# Patient Record
Sex: MALE | Race: WHITE | Employment: FULL TIME | URBAN - METROPOLITAN AREA
[De-identification: names, ages, dates, MRNs, and addresses within clinical notes are randomized per-mention and may not be internally consistent; named-entity substitution may affect disease eponyms.]

---

## 2017-01-23 ENCOUNTER — TELEPHONE (OUTPATIENT)
Dept: GASTROENTEROLOGY | Age: 27
End: 2017-01-23

## 2017-02-21 ENCOUNTER — OFFICE VISIT (OUTPATIENT)
Dept: GASTROENTEROLOGY | Age: 27
End: 2017-02-21

## 2017-02-21 VITALS
BODY MASS INDEX: 28.35 KG/M2 | DIASTOLIC BLOOD PRESSURE: 86 MMHG | OXYGEN SATURATION: 98 % | HEART RATE: 84 BPM | HEIGHT: 70 IN | SYSTOLIC BLOOD PRESSURE: 114 MMHG | WEIGHT: 198 LBS

## 2017-02-21 DIAGNOSIS — K21.9 GASTROESOPHAGEAL REFLUX DISEASE WITHOUT ESOPHAGITIS: ICD-10-CM

## 2017-02-21 DIAGNOSIS — R11.0 NAUSEA: ICD-10-CM

## 2017-02-21 DIAGNOSIS — R19.7 DIARRHEA, UNSPECIFIED TYPE: ICD-10-CM

## 2017-02-21 DIAGNOSIS — R10.33 PERIUMBILICAL ABDOMINAL PAIN: Primary | ICD-10-CM

## 2017-02-21 PROCEDURE — 99213 OFFICE O/P EST LOW 20 MIN: CPT | Performed by: INTERNAL MEDICINE

## 2017-02-21 RX ORDER — AMITRIPTYLINE HYDROCHLORIDE 25 MG/1
25 TABLET, FILM COATED ORAL NIGHTLY
Qty: 30 TABLET | Refills: 5 | Status: SHIPPED | OUTPATIENT
Start: 2017-02-21

## 2017-02-21 ASSESSMENT — ENCOUNTER SYMPTOMS
DIARRHEA: 1
ABDOMINAL PAIN: 1
EYES NEGATIVE: 1
RESPIRATORY NEGATIVE: 1
ALLERGIC/IMMUNOLOGIC NEGATIVE: 1

## 2017-03-15 ENCOUNTER — TELEPHONE (OUTPATIENT)
Dept: GASTROENTEROLOGY | Age: 27
End: 2017-03-15

## 2017-03-15 RX ORDER — DICYCLOMINE HYDROCHLORIDE 10 MG/1
10 CAPSULE ORAL
Qty: 120 CAPSULE | Refills: 3 | Status: SHIPPED | OUTPATIENT
Start: 2017-03-15 | End: 2018-02-22 | Stop reason: SDUPTHER

## 2017-04-04 ENCOUNTER — TELEPHONE (OUTPATIENT)
Dept: GASTROENTEROLOGY | Age: 27
End: 2017-04-04

## 2017-10-18 ENCOUNTER — TELEPHONE (OUTPATIENT)
Dept: GASTROENTEROLOGY | Age: 27
End: 2017-10-18

## 2017-10-18 NOTE — TELEPHONE ENCOUNTER
Pt called and LM on  requesting refill of Elavil. Pt states Dr. Marisol Herrera prescribed this and he is fine with scheduling an appt if he needs seen. Pt has not been seen since February. Pt will need to schedule an appt with Mason Bear if he chooses to stay with the office after informing him of Dr. Jesi Stevens departure. Attempted to call pt to inform him of Dr. Jesi Stevens departure and to schedule appt to get refill by Mason Bear and LM on  for patient to return call to office.

## 2017-10-18 NOTE — TELEPHONE ENCOUNTER
Patent called back and he has been scheduled for appt with Rubina Pandey on 10/31/17 at 800am. Patient expressed understanding and had no further questions.

## 2017-11-07 ENCOUNTER — TELEPHONE (OUTPATIENT)
Dept: GASTROENTEROLOGY | Age: 27
End: 2017-11-07

## 2017-11-07 NOTE — TELEPHONE ENCOUNTER
Patient called to reschedule appt with Merrily Schilder that he missed on 11/2/17. Patient has been rescheduled to 11/16/17 at 400pm. Patient expressed understanding and had no further questions.

## 2018-02-07 ENCOUNTER — TELEPHONE (OUTPATIENT)
Dept: GASTROENTEROLOGY | Age: 28
End: 2018-02-07

## 2018-02-07 NOTE — TELEPHONE ENCOUNTER
Patient called to make an appt with Zee Yates for abd pain. patient has been seen before but has not showed up for last couple appts. Patient has been scheduled to see Nadege Christopher on 2/22/18 at 220pm. Patient expressed understanding and had no further questions.

## 2018-02-22 ENCOUNTER — OFFICE VISIT (OUTPATIENT)
Dept: GASTROENTEROLOGY | Age: 28
End: 2018-02-22

## 2018-02-22 VITALS
BODY MASS INDEX: 26.96 KG/M2 | HEART RATE: 66 BPM | HEIGHT: 69 IN | SYSTOLIC BLOOD PRESSURE: 116 MMHG | DIASTOLIC BLOOD PRESSURE: 62 MMHG | WEIGHT: 182 LBS | OXYGEN SATURATION: 95 %

## 2018-02-22 DIAGNOSIS — R10.33 PERIUMBILICAL ABDOMINAL PAIN: ICD-10-CM

## 2018-02-22 DIAGNOSIS — R19.7 DIARRHEA, UNSPECIFIED TYPE: Primary | ICD-10-CM

## 2018-02-22 DIAGNOSIS — K21.9 GASTROESOPHAGEAL REFLUX DISEASE WITHOUT ESOPHAGITIS: ICD-10-CM

## 2018-02-22 PROCEDURE — 99214 OFFICE O/P EST MOD 30 MIN: CPT | Performed by: NURSE PRACTITIONER

## 2018-02-22 RX ORDER — DICYCLOMINE HYDROCHLORIDE 10 MG/1
10 CAPSULE ORAL
Qty: 120 CAPSULE | Refills: 3 | Status: SHIPPED | OUTPATIENT
Start: 2018-02-22

## 2018-02-22 ASSESSMENT — ENCOUNTER SYMPTOMS
HEMOPTYSIS: 0
COUGH: 0
ABDOMINAL PAIN: 1
BLOOD IN STOOL: 0
DIARRHEA: 1
BACK PAIN: 0
VOMITING: 0
PHOTOPHOBIA: 0
NAUSEA: 0
CONSTIPATION: 0
BLURRED VISION: 0
SPUTUM PRODUCTION: 0
DOUBLE VISION: 0
HEARTBURN: 1

## 2018-02-22 NOTE — PATIENT INSTRUCTIONS
anti-inflammatory medicines such as aspirin, ibuprofen (Advil, Motrin), and naproxen (Aleve). These can cause stomach upset. Talk to your doctor if you take daily aspirin for another health problem. When should you call for help? Call 911 anytime you think you may need emergency care. For example, call if:  ? · You passed out (lost consciousness). ? · You pass maroon or very bloody stools. ? · You vomit blood or what looks like coffee grounds. ? · You have new, severe belly pain. ?Call your doctor now or seek immediate medical care if:  ? · Your pain gets worse, especially if it becomes focused in one area of your belly. ? · You have a new or higher fever. ? · Your stools are black and look like tar, or they have streaks of blood. ? · You have unexpected vaginal bleeding. ? · You have symptoms of a urinary tract infection. These may include:  ¨ Pain when you urinate. ¨ Urinating more often than usual.  ¨ Blood in your urine. ? · You are dizzy or lightheaded, or you feel like you may faint. ? Watch closely for changes in your health, and be sure to contact your doctor if:  ? · You are not getting better after 1 day (24 hours). Where can you learn more? Go to https://Mobile Iron.[employer]. org and sign in to your [employer] account. Enter Q848 in the [facility] box to learn more about \"Abdominal Pain: Care Instructions. \"     If you do not have an account, please click on the \"Sign Up Now\" link. Current as of: March 20, 2017  Content Version: 11.5  © 4901-0685 [employer]. Care instructions adapted under license by [facility] ([facility]). If you have questions about a medical condition or this instruction, always ask your healthcare professional. [place] [address] any warranty or liability for your use of this information.        Patient Education        High-Fiber Diet: Care Instructions  Your Care Instructions    A high-fiber diet may help you relieve water). If you have kidney, heart, or liver disease and have to limit fluids, talk with your doctor before you increase the amount of fluids you drink. · Get at least 30 minutes of exercise on most days of the week. Walking is a good choice. You also may want to do other activities, such as running, swimming, cycling, or playing tennis or team sports. · Take a fiber supplement, such as Citrucel or Metamucil, every day if needed. Read and follow all instructions on the label. · Schedule time each day for a bowel movement. Having a daily routine may help. Take your time and do not strain when having a bowel movement. Some people avoid nuts, seeds, berries, and popcorn. They believe that these foods might get trapped in the diverticula and cause pain. But there is no proof that these foods cause diverticulitis or make it worse. How are these problems treated? · The best way to treat diverticulosis is to avoid constipation. (See the tips above.)  · Treatment for diverticulitis includes antibiotics and often a change in your diet. You may need only liquids at first. Your doctor may suggest pain medicines for pain or belly cramps. In some cases, surgery may be needed. Follow-up care is a key part of your treatment and safety. Be sure to make and go to all appointments, and call your doctor if you are having problems. It's also a good idea to know your test results and keep a list of the medicines you take. Where can you learn more? Go to https://[employer]. org and sign in to your "[employer] account. Enter M518 in the [facility] box to learn more about \"Learning About Diverticulosis and Diverticulitis. \"     If you do not have an account, please click on the \"Sign Up Now\" link. Current as of: May 12, 2017  Content Version: 11.5  © 9250-2800 [employer]. Care instructions adapted under license by [facility] ([facility]).  If you have questions about a medical condition or this instruction, always ask your healthcare professional. [place] [address] any warranty or liability for your use of this information.

## 2018-02-22 NOTE — PROGRESS NOTES
Igor Dixon 28 y.o. male was seen by MARLO Martinez on 02/26/18     Wt Readings from Last 3 Encounters:   02/22/18 182 lb (82.6 kg)   10/29/17 180 lb (81.6 kg)   07/05/17 195 lb (88.5 kg)       HPI    28year old  male here for abdominal pain. He has a past medical history of dizziness; diarrhea, H/O echocardiogram;hypotension; and migraines. His EGD/colonoscopy were completed on 4-. EGD revealed mild gastritis and small hiatal hernia. Biopsies were negative for H. Pylori and Celiac sprue. His colonoscopy revealed normal colonoscopy. His colon biopsies were negative for colitis. In past week he has had diarrhea. CT of abdomen/pelvis completed on 10- revealed splenomegaly and mild diverticulosis without evidence of acute diverticulitis. 9- abdominal ultrasound normal and 12-7-2016 HIDA scan normal.  He did not complete his small bowel follow through. On Monday he had four to five episodes of light green liquid stools. He had to leave work because of the periumbilical cramping pain. He described his pain as a sharp cramping pain. His pain worsened with movement. His diarrhea occurs a few times a month. No fever or chills. No sick contacts or recent travel. His pain resolved byTuesday afternoon. His diarrhea has changed to soft brown mushy stools. Elavil helps some. No blood in his stools or black tarry stools. No nausea or vomiting. His appetite is good without early satiety. His weight is stable. No heartburn or acid reflux. He has not used Prilosec for a year. No nocturnal awakenings with acid reflux. No dysphagia or pain wth swallowing. No bloating. No family history of stomach or colon cancer. ROS    Review of Systems   Constitutional: Negative for chills, fever, malaise/fatigue and weight loss. HENT: Negative for ear pain, hearing loss and tinnitus. Eyes: Negative for blurred vision, double vision and photophobia.    Respiratory: GROWTH   Final    Culture 07/05/2017 NO ANAEROBIC GROWTH AT 72 HOURS   Final    Report Status 07/05/2017 FINAL 07/08/2017   Final    Organism 07/05/2017 SAMARINA   Final       Assessment      ICD-10-CM ICD-9-CM    1. Diarrhea, unspecified type R19.7 787.91 Culture Stool      CALPROTECTIN STOOL      Fecal Leukocytes      GIARDIA ANTIGEN      C DIFF TOXIN/ANTIGEN      OVA & PARASITE ID/COUNT #1      FL Small Bowel Follow Through Only   2. Periumbilical abdominal pain R10.33 789.05 FL Small Bowel Follow Through Only   3. Gastroesophageal reflux disease without esophagitis K21.9 530.81        Plan    1. Will order stool studies to evaluate for infection vs. Inflammation. 2.  Will order Bentyl 10 mg take as needed for periumbilical cramping and diarrhea. 3.  The patient was encouraged to continue taking Elavil 25 mg daily for IBS-D.  4.  Will order small bowel follow through to evaluate pain and diarrhea. 5.  The patient was provided with information on diarrhea. 6.  The patient was encouraged to follow-up in 8 weeks or sooner if symptoms worsen. If his diarrhea continues to be persistent may schedule colonoscopy.

## 2018-02-27 ENCOUNTER — TELEPHONE (OUTPATIENT)
Dept: GASTROENTEROLOGY | Age: 28
End: 2018-02-27

## 2018-03-07 ENCOUNTER — TELEPHONE (OUTPATIENT)
Dept: GASTROENTEROLOGY | Age: 28
End: 2018-03-07

## 2020-06-11 ENCOUNTER — APPOINTMENT (OUTPATIENT)
Dept: CT IMAGING | Age: 30
End: 2020-06-11
Payer: COMMERCIAL

## 2020-06-11 ENCOUNTER — HOSPITAL ENCOUNTER (EMERGENCY)
Age: 30
Discharge: HOME | End: 2020-06-11
Attending: EMERGENCY MEDICINE
Payer: COMMERCIAL

## 2020-06-11 ENCOUNTER — APPOINTMENT (OUTPATIENT)
Dept: GENERAL RADIOLOGY | Age: 30
End: 2020-06-11
Payer: COMMERCIAL

## 2020-06-11 VITALS
DIASTOLIC BLOOD PRESSURE: 78 MMHG | HEIGHT: 70 IN | TEMPERATURE: 98.1 F | SYSTOLIC BLOOD PRESSURE: 128 MMHG | RESPIRATION RATE: 18 BRPM | OXYGEN SATURATION: 98 % | BODY MASS INDEX: 28.63 KG/M2 | WEIGHT: 200 LBS | HEART RATE: 72 BPM

## 2020-06-11 PROCEDURE — 73030 X-RAY EXAM OF SHOULDER: CPT

## 2020-06-11 PROCEDURE — 71046 X-RAY EXAM CHEST 2 VIEWS: CPT

## 2020-06-11 PROCEDURE — 70450 CT HEAD/BRAIN W/O DYE: CPT

## 2020-06-11 PROCEDURE — 72125 CT NECK SPINE W/O DYE: CPT

## 2020-06-11 PROCEDURE — 6370000000 HC RX 637 (ALT 250 FOR IP): Performed by: EMERGENCY MEDICINE

## 2020-06-11 PROCEDURE — 99284 EMERGENCY DEPT VISIT MOD MDM: CPT

## 2020-06-11 RX ORDER — NAPROXEN 500 MG/1
500 TABLET ORAL 2 TIMES DAILY PRN
Qty: 20 TABLET | Refills: 0 | Status: SHIPPED | OUTPATIENT
Start: 2020-06-11 | End: 2020-06-21

## 2020-06-11 RX ORDER — HYDROCODONE BITARTRATE AND ACETAMINOPHEN 5; 325 MG/1; MG/1
2 TABLET ORAL ONCE
Status: COMPLETED | OUTPATIENT
Start: 2020-06-11 | End: 2020-06-11

## 2020-06-11 RX ORDER — LIDOCAINE 4 G/G
1 PATCH TOPICAL ONCE
Status: DISCONTINUED | OUTPATIENT
Start: 2020-06-11 | End: 2020-06-11 | Stop reason: HOSPADM

## 2020-06-11 RX ORDER — METHOCARBAMOL 500 MG/1
500 TABLET, FILM COATED ORAL 4 TIMES DAILY
Qty: 40 TABLET | Refills: 0 | Status: SHIPPED | OUTPATIENT
Start: 2020-06-11 | End: 2020-06-21

## 2020-06-11 RX ORDER — IBUPROFEN 600 MG/1
600 TABLET ORAL ONCE
Status: COMPLETED | OUTPATIENT
Start: 2020-06-11 | End: 2020-06-11

## 2020-06-11 RX ORDER — LIDOCAINE 50 MG/G
1 PATCH TOPICAL DAILY
Qty: 30 PATCH | Refills: 0 | Status: SHIPPED | OUTPATIENT
Start: 2020-06-11

## 2020-06-11 RX ADMIN — HYDROCODONE BITARTRATE AND ACETAMINOPHEN 2 TABLET: 5; 325 TABLET ORAL at 18:45

## 2020-06-11 RX ADMIN — IBUPROFEN 600 MG: 600 TABLET ORAL at 19:07

## 2020-06-11 ASSESSMENT — PAIN DESCRIPTION - PAIN TYPE: TYPE: ACUTE PAIN

## 2020-06-11 ASSESSMENT — PAIN SCALES - GENERAL
PAINLEVEL_OUTOF10: 9
PAINLEVEL_OUTOF10: 5
PAINLEVEL_OUTOF10: 5

## 2020-06-11 ASSESSMENT — PAIN DESCRIPTION - LOCATION: LOCATION: NECK;BACK

## 2020-06-11 NOTE — ED PROVIDER NOTES
status:      Spouse name: Not on file    Number of children: Not on file    Years of education: Not on file    Highest education level: Not on file   Occupational History    Not on file   Social Needs    Financial resource strain: Not on file    Food insecurity     Worry: Not on file     Inability: Not on file    Transportation needs     Medical: Not on file     Non-medical: Not on file   Tobacco Use    Smoking status: Never Smoker    Smokeless tobacco: Never Used   Substance and Sexual Activity    Alcohol use: Yes     Comment: occas    Drug use: No    Sexual activity: Yes     Partners: Female   Lifestyle    Physical activity     Days per week: Not on file     Minutes per session: Not on file    Stress: Not on file   Relationships    Social connections     Talks on phone: Not on file     Gets together: Not on file     Attends Catholic service: Not on file     Active member of club or organization: Not on file     Attends meetings of clubs or organizations: Not on file     Relationship status: Not on file    Intimate partner violence     Fear of current or ex partner: Not on file     Emotionally abused: Not on file     Physically abused: Not on file     Forced sexual activity: Not on file   Other Topics Concern    Not on file   Social History Narrative    Not on file     Current Facility-Administered Medications   Medication Dose Route Frequency Provider Last Rate Last Dose    lidocaine 4 % external patch 1 patch  1 patch Transdermal Once Leigh Gramajo MD   1 patch at 06/11/20 1029     Current Outpatient Medications   Medication Sig Dispense Refill    methocarbamol (ROBAXIN) 500 MG tablet Take 1 tablet by mouth 4 times daily for 10 days 40 tablet 0    naproxen (NAPROSYN) 500 MG tablet Take 1 tablet by mouth 2 times daily as needed for Pain 20 tablet 0    lidocaine (LIDODERM) 5 % Place 1 patch onto the skin daily 12 hours on, 12 hours off.  30 patch 0    dicyclomine (BENTYL) 10 MG available lab results from this visit (if applicable):  No results found for this visit on 06/11/20. Radiographs (if obtained):  Radiologist's Report Reviewed:  Xr Chest Standard (2 Vw)    Result Date: 6/11/2020  EXAMINATION: TWO XRAY VIEWS OF THE CHEST 6/11/2020 5:47 pm COMPARISON: Chest x-ray July 31, 2015 HISTORY: ORDERING SYSTEM PROVIDED HISTORY: trauma TECHNOLOGIST PROVIDED HISTORY: Reason for exam:->trauma Reason for Exam: right side chest pain Acuity: Acute Type of Exam: Initial Mechanism of Injury: fall from his backyard deck Relevant Medical/Surgical History: na FINDINGS: The lungs are without acute focal process. There is no effusion or pneumothorax. The cardiomediastinal silhouette is without acute process. The osseous structures are without acute process. No acute process. Xr Shoulder Right (min 2 Views)    Result Date: 6/11/2020  EXAMINATION: THREE XRAY VIEWS OF THE RIGHT SHOULDER 6/11/2020 5:47 pm COMPARISON: None. HISTORY: ORDERING SYSTEM PROVIDED HISTORY: trauma TECHNOLOGIST PROVIDED HISTORY: Reason for exam:->trauma Reason for Exam: right shoulder pain Acuity: Acute Type of Exam: Initial Mechanism of Injury: fall from his backyard deck Relevant Medical/Surgical History: na FINDINGS: No fractures or dislocations. No suspicious focal bony lesions. No bony erosions or bony destructive changes. AC and glenohumeral joints are unremarkable. No significant soft tissue abnormalities. Visualized lung fields appear clear. Unremarkable exam.     Ct Head Wo Contrast    Result Date: 6/11/2020  EXAMINATION: CT OF THE CERVICAL SPINE WITHOUT CONTRAST; CT OF THE HEAD WITHOUT CONTRAST 6/11/2020 6:10 pm TECHNIQUE: CT of the cervical spine was performed without the administration of intravenous contrast. Multiplanar reformatted images are provided for review.  Dose modulation, iterative reconstruction, and/or weight based adjustment of the mA/kV was utilized to reduce the radiation dose to as low as

## 2020-06-25 ENCOUNTER — APPOINTMENT (OUTPATIENT)
Dept: GENERAL RADIOLOGY | Age: 30
End: 2020-06-25
Payer: COMMERCIAL

## 2020-06-25 ENCOUNTER — APPOINTMENT (OUTPATIENT)
Dept: CT IMAGING | Age: 30
End: 2020-06-25
Payer: COMMERCIAL

## 2020-06-25 ENCOUNTER — HOSPITAL ENCOUNTER (EMERGENCY)
Age: 30
Discharge: HOME | End: 2020-06-25
Payer: COMMERCIAL

## 2020-06-25 VITALS
HEIGHT: 70 IN | SYSTOLIC BLOOD PRESSURE: 157 MMHG | BODY MASS INDEX: 28.63 KG/M2 | WEIGHT: 200 LBS | TEMPERATURE: 98.2 F | OXYGEN SATURATION: 99 % | HEART RATE: 68 BPM | DIASTOLIC BLOOD PRESSURE: 103 MMHG | RESPIRATION RATE: 16 BRPM

## 2020-06-25 PROCEDURE — 73030 X-RAY EXAM OF SHOULDER: CPT

## 2020-06-25 PROCEDURE — 99284 EMERGENCY DEPT VISIT MOD MDM: CPT

## 2020-06-25 PROCEDURE — 72125 CT NECK SPINE W/O DYE: CPT

## 2020-06-25 RX ORDER — OXYCODONE HYDROCHLORIDE AND ACETAMINOPHEN 5; 325 MG/1; MG/1
1 TABLET ORAL EVERY 6 HOURS PRN
Qty: 12 TABLET | Refills: 0 | Status: SHIPPED | OUTPATIENT
Start: 2020-06-25 | End: 2020-06-28

## 2020-06-25 ASSESSMENT — PAIN DESCRIPTION - LOCATION: LOCATION: NECK

## 2020-06-25 ASSESSMENT — PAIN DESCRIPTION - PAIN TYPE: TYPE: ACUTE PAIN

## 2020-06-25 ASSESSMENT — PAIN SCALES - GENERAL: PAINLEVEL_OUTOF10: 10

## 2020-06-26 NOTE — ED PROVIDER NOTES
organizations: None     Relationship status: None    Intimate partner violence     Fear of current or ex partner: None     Emotionally abused: None     Physically abused: None     Forced sexual activity: None   Other Topics Concern    None   Social History Narrative    None       PHYSICAL EXAM    VITAL SIGNS: BP (!) 157/103   Pulse 68   Temp 98.2 °F (36.8 °C) (Oral)   Resp 16   Ht 5' 10\" (1.778 m)   Wt 200 lb (90.7 kg)   SpO2 99%   BMI 28.70 kg/m²   Constitutional:  Well-developed, well-nourished, no acute distress  HENT:  NC/AT. Ears, nose, mouth normal.  Neck:  No midline tenderness or step-offs. Right para-cervical tenderness. Normal ROM. Respiratory:  Respirations unlabored. Musculoskeletal:  No swelling or deformity of the RUE. Limited abduction and overhead extension due to pain. DP intact. Integument:  Well hydrated. Neurologic:  Alert and oriented. RADIOLOGY/PROCEDURES    Xr Chest Standard (2 Vw)    Result Date: 6/11/2020  EXAMINATION: TWO XRAY VIEWS OF THE CHEST 6/11/2020 5:47 pm COMPARISON: Chest x-ray July 31, 2015 HISTORY: ORDERING SYSTEM PROVIDED HISTORY: trauma TECHNOLOGIST PROVIDED HISTORY: Reason for exam:->trauma Reason for Exam: right side chest pain Acuity: Acute Type of Exam: Initial Mechanism of Injury: fall from his backyard deck Relevant Medical/Surgical History: na FINDINGS: The lungs are without acute focal process. There is no effusion or pneumothorax. The cardiomediastinal silhouette is without acute process. The osseous structures are without acute process. No acute process. Xr Shoulder Right (min 2 Views)    Result Date: 6/25/2020  EXAMINATION: THREE XRAY VIEWS OF THE RIGHT SHOULDER 6/25/2020 10:42 pm COMPARISON: None.  HISTORY: ORDERING SYSTEM PROVIDED HISTORY: injury TECHNOLOGIST PROVIDED HISTORY: Reason for exam:->injury Reason for Exam: fell off deck Acuity: Acute Type of Exam: Initial FINDINGS: The joint spaces are maintained, the osseous structures

## 2021-01-21 ENCOUNTER — HOSPITAL ENCOUNTER (EMERGENCY)
Age: 31
Discharge: HOME | End: 2021-01-21
Attending: EMERGENCY MEDICINE
Payer: COMMERCIAL

## 2021-01-21 ENCOUNTER — APPOINTMENT (OUTPATIENT)
Dept: GENERAL RADIOLOGY | Age: 31
End: 2021-01-21
Payer: COMMERCIAL

## 2021-01-21 VITALS
SYSTOLIC BLOOD PRESSURE: 122 MMHG | RESPIRATION RATE: 16 BRPM | HEART RATE: 63 BPM | TEMPERATURE: 97.8 F | WEIGHT: 200 LBS | OXYGEN SATURATION: 98 % | DIASTOLIC BLOOD PRESSURE: 94 MMHG | BODY MASS INDEX: 28 KG/M2 | HEIGHT: 71 IN

## 2021-01-21 DIAGNOSIS — S67.21XA CRUSH INJURY OF HAND, RIGHT, INITIAL ENCOUNTER: Primary | ICD-10-CM

## 2021-01-21 PROCEDURE — 73130 X-RAY EXAM OF HAND: CPT

## 2021-01-21 PROCEDURE — 99283 EMERGENCY DEPT VISIT LOW MDM: CPT

## 2021-01-21 PROCEDURE — 73090 X-RAY EXAM OF FOREARM: CPT

## 2021-01-21 PROCEDURE — 6370000000 HC RX 637 (ALT 250 FOR IP): Performed by: EMERGENCY MEDICINE

## 2021-01-21 RX ORDER — IBUPROFEN 400 MG/1
600 TABLET ORAL ONCE
Status: COMPLETED | OUTPATIENT
Start: 2021-01-21 | End: 2021-01-21

## 2021-01-21 RX ORDER — IBUPROFEN 600 MG/1
600 TABLET ORAL EVERY 6 HOURS PRN
Qty: 20 TABLET | Refills: 0 | Status: SHIPPED | OUTPATIENT
Start: 2021-01-21 | End: 2021-02-20

## 2021-01-21 RX ORDER — HYDROCODONE BITARTRATE AND ACETAMINOPHEN 5; 325 MG/1; MG/1
1 TABLET ORAL ONCE
Status: COMPLETED | OUTPATIENT
Start: 2021-01-21 | End: 2021-01-21

## 2021-01-21 RX ADMIN — IBUPROFEN 600 MG: 400 TABLET, FILM COATED ORAL at 17:28

## 2021-01-21 RX ADMIN — HYDROCODONE BITARTRATE AND ACETAMINOPHEN 1 TABLET: 5; 325 TABLET ORAL at 17:28

## 2021-01-21 ASSESSMENT — PAIN SCALES - GENERAL: PAINLEVEL_OUTOF10: 9

## 2021-01-21 ASSESSMENT — PAIN DESCRIPTION - PAIN TYPE: TYPE: ACUTE PAIN

## 2021-01-21 ASSESSMENT — PAIN DESCRIPTION - LOCATION: LOCATION: WRIST

## 2021-01-21 NOTE — ED PROVIDER NOTES
Triage Chief Complaint:   Wrist Injury (RIGHT/ REPORTS SMASHED BETWEEN CABINET)      [place]:  Yola Setting is a 31 y.o. male that presents to the emergency department stating that he had a crush injury to his right hand and wrist on some equipment he has at home. States pain is aching, throbbing, 8 out of 10. States he had a prior pin placed along his fifth metacarpal in high school. Full range of motion of fingers and wrist..    Past Medical History:   Diagnosis Date    Dizziness     H/O echocardiogram 12/27/2012    EF 57%, no valvular regurgitation, no stenosis, no MVP, no effusion.  HX OTHER MEDICAL 03-    24 hour holter predominant sinus rhythm with infrequent ventricular and supraventricular ectopy.  No sustained ectopy seen    Hypotension     Migraines      Past Surgical History:   Procedure Laterality Date    COLONOSCOPY  4/28/2016    normal colonoscopy    ENDOSCOPY, COLON, DIAGNOSTIC  4/28/2016    mild gastritis, small hiatal hernia, biosies to rule out celiac sprue and h pylori    EXPLORATION OF UNDESCENDED TESTICLE  2005    HAND SURGERY  2006    HAND SURGERY Right     TONSILLECTOMY AND ADENOIDECTOMY  2005     Family History   Problem Relation Age of Onset    Asthma Mother      Social History     Socioeconomic History    Marital status:      Spouse name: Not on file    Number of children: Not on file    Years of education: Not on file    Highest education level: Not on file   Occupational History    Not on file   Social Needs    Financial resource strain: Not on file    Food insecurity     Worry: Not on file     Inability: Not on file   [employer] needs     Medical: Not on file     Non-medical: Not on file   Tobacco Use    Smoking status: Never Smoker    Smokeless tobacco: Never Used   Substance and Sexual Activity    Alcohol use: Yes     Comment: occas    Drug use: No    Sexual activity: Yes     Partners: Female   Lifestyle    Physical activity Days per week: Not on file     Minutes per session: Not on file    Stress: Not on file   Relationships    Social connections     Talks on phone: Not on file     Gets together: Not on file     Attends Caodaism service: Not on file     Active member of club or organization: Not on file     Attends meetings of clubs or organizations: Not on file     Relationship status: Not on file    Intimate partner violence     Fear of current or ex partner: Not on file     Emotionally abused: Not on file     Physically abused: Not on file     Forced sexual activity: Not on file   Other Topics Concern    Not on file   Social History Narrative    Not on file     No current facility-administered medications for this encounter. Current Outpatient Medications   Medication Sig Dispense Refill    ibuprofen (IBU) 600 MG tablet Take 1 tablet by mouth every 6 hours as needed for Pain 20 tablet 0    naproxen (NAPROSYN) 500 MG tablet Take 1 tablet by mouth 2 times daily as needed for Pain 20 tablet 0    lidocaine (LIDODERM) 5 % Place 1 patch onto the skin daily 12 hours on, 12 hours off. 30 patch 0    dicyclomine (BENTYL) 10 MG capsule Take 1 capsule by mouth 3 times daily (before meals) 120 capsule 3    HYDROcodone-acetaminophen (NORCO) 5-325 MG per tablet Take 1 tablet by mouth every 6 hours as needed for Pain .  amitriptyline (ELAVIL) 25 MG tablet Take 1 tablet by mouth nightly 30 tablet 5    omeprazole (PRILOSEC OTC) 20 MG tablet Take 1 tablet by mouth daily 30 tablet 3    aspirin-acetaminophen-caffeine (EXCEDRIN MIGRAINE) 250-250-65 MG per tablet Take 1 tablet by mouth every 6 hours as needed for Headaches       No Known Allergies  Nursing Notes Reviewed    ROS:  At least 10 systems reviewed and otherwise negative except as in the [address]     Physical Exam:  ED Triage Vitals   Enc Vitals Group      BP       Pulse       Resp       Temp       Temp src       SpO2       Weight       Height       Head Circumference Peak Flow       Pain Score       Pain Loc       Pain Edu? Excl. in [address]? My pulse oximetry interpretation is which is within the normal range    GENERAL APPEARANCE: Awake and alert. Cooperative. No acute distress. HEAD: Normocephalic. Atraumatic. EYES: EOM's grossly intact. Sclera anicteric. ENT: Mucous membranes are moist. Tolerates saliva. No trismus. NECK: Supple. No meningismus. Trachea midline. HEART: RRR. Radial pulses 2+. LUNGS: Respirations unlabored. CTAB  ABDOMEN: Soft. Non-tender. No guarding or rebound. EXTREMITIES: Patient with no obvious deformities to right hand or right wrist.  Strong pulses. Sensation intact. Good cap refill. Full range of motion at right wrist and all fingers. No abrasions or lacerations. SKIN: Warm and dry. NEUROLOGICAL: No gross facial drooping. Moves all 4 extremities spontaneously. PSYCHIATRIC: Normal mood. I have reviewed and interpreted all of the currently available lab results from this visit (if applicable):  No results found for this visit on 01/21/21. Radiographs:  [] Radiologist's Wet Read Report Reviewed:      XR HAND RIGHT (MIN 3 VIEWS) (Final result)  Result time 01/21/21 17:59:00  Final result by Estella Gibbs MD (01/21/21 17:59:00)                Impression:    1. No acute osseous abnormality of the right forearm or right hand identified. Narrative:    EXAMINATION:   THREE XRAY VIEWS OF THE RIGHT HAND; TWO XRAY VIEWS OF THE RIGHT FOREARM     1/21/2021 5:16 pm     COMPARISON:   None.      HISTORY:   ORDERING SYSTEM PROVIDED HISTORY: crush injury   TECHNOLOGIST PROVIDED HISTORY:   Reason for exam:->crush injury   Reason for Exam: crush injury   Acuity: Acute   Type of Exam: Initial   Mechanism of Injury: crush injury to his right hand and wrist on some   equipment he has at home   Relevant Medical/Surgical History: rt hand pain and swelling; ORDERING SYSTEM   PROVIDED HISTORY: crush injury TECHNOLOGIST PROVIDED HISTORY:   Reason for exam:->crush injury   Reason for Exam: crush injury   Acuity: Acute   Type of Exam: Initial   Mechanism of Injury: crush injury to right hand and wrist on some equipment   he has at home   Relevant Medical/Surgical History: rt forearm pain     FINDINGS:   Right forearm: Two views of the right forearm demonstrate no acute fracture   or dislocation.  Alignment is anatomic.  The joint spaces appear preserved. Soft tissues appear grossly unremarkable. Right hand: Three views of the right hand demonstrate no acute fracture or   dislocation.  Alignment is anatomic and the joint spaces appear preserved.                     XR RADIUS ULNA RIGHT (2 VIEWS) (Final result)  Result time 01/21/21 17:59:00  Final result by Carmen Lord MD (01/21/21 17:59:00)                Impression:    1. No acute osseous abnormality of the right forearm or right hand identified. Narrative:    EXAMINATION:   THREE XRAY VIEWS OF THE RIGHT HAND; TWO XRAY VIEWS OF THE RIGHT FOREARM     1/21/2021 5:16 pm     COMPARISON:   None. HISTORY:   ORDERING SYSTEM PROVIDED HISTORY: crush injury   TECHNOLOGIST PROVIDED HISTORY:   Reason for exam:->crush injury   Reason for Exam: crush injury   Acuity: Acute   Type of Exam: Initial   Mechanism of Injury: crush injury to his right hand and wrist on some   equipment he has at home   Relevant Medical/Surgical History: rt hand pain and swelling; ORDERING SYSTEM   PROVIDED HISTORY: crush injury   TECHNOLOGIST PROVIDED HISTORY:   Reason for exam:->crush injury   Reason for Exam: crush injury   Acuity: Acute   Type of Exam: Initial   Mechanism of Injury: crush injury to right hand and wrist on some equipment   he has at home   Relevant Medical/Surgical History: rt forearm pain     FINDINGS:   Right forearm: Two views of the right forearm demonstrate no acute fracture   or dislocation.  Alignment is anatomic.  The joint spaces appear preserved. Soft tissues appear grossly unremarkable. Right hand: Three views of the right hand demonstrate no acute fracture or   dislocation.  Alignment is anatomic and the joint spaces appear preserved. [] Discussed with Radiologist:     [] The following radiograph was interpreted by myself in the absence of a radiologist:     EKG: (All EKG's are interpreted by myself in the absence of a cardiologist)      MDM:  Vitals show normal vital signs. . Given icepack, motrin, norco. Xray imaging shows no acute osseous abnormality. Discussed results with patient. Recommended rest, ice, anti-inflammatories. Follow-up PCP. Clinical Impression:  1.  Crush injury of hand, right, initial encounter        Disposition Vitals:  [unfilled], [unfilled], [unfilled], [unfilled]    Disposition referral (if applicable):  Carole Ramirez DO  [facility]  [phone]            Disposition medications (if applicable):  New Prescriptions    IBUPROFEN (IBU) 600 MG TABLET    Take 1 tablet by mouth every 6 hours as needed for Pain         (Please note that portions of this note may have been completed with a voice recognition program. Efforts were made to edit the dictations but occasionally words are mis-transcribed.)    MD Patricio Hernandez MD  01/21/21 [id number]

## 2025-06-23 ENCOUNTER — TRANSCRIBE ORDERS (OUTPATIENT)
Dept: ADMINISTRATIVE | Age: 35
End: 2025-06-23

## 2025-06-23 DIAGNOSIS — R11.0 NAUSEA: ICD-10-CM

## 2025-06-23 DIAGNOSIS — R10.32 ABDOMINAL PAIN, LEFT LOWER QUADRANT: Primary | ICD-10-CM

## 2025-07-14 ENCOUNTER — HOSPITAL ENCOUNTER (OUTPATIENT)
Dept: ULTRASOUND IMAGING | Age: 35
Discharge: HOME | End: 2025-07-14
Attending: INTERNAL MEDICINE
Payer: COMMERCIAL

## 2025-07-14 ENCOUNTER — HOSPITAL ENCOUNTER (OUTPATIENT)
Dept: NUCLEAR MEDICINE | Age: 35
Discharge: HOME | End: 2025-07-14
Attending: INTERNAL MEDICINE
Payer: COMMERCIAL

## 2025-07-14 DIAGNOSIS — R10.32 ABDOMINAL PAIN, LEFT LOWER QUADRANT: ICD-10-CM

## 2025-07-14 DIAGNOSIS — R11.0 NAUSEA: ICD-10-CM

## 2025-07-14 PROCEDURE — A9537 TC99M MEBROFENIN: HCPCS | Performed by: INTERNAL MEDICINE

## 2025-07-14 PROCEDURE — 3430000000 HC RX DIAGNOSTIC RADIOPHARMACEUTICAL: Performed by: INTERNAL MEDICINE

## 2025-07-14 PROCEDURE — 2580000003 HC RX 258: Performed by: INTERNAL MEDICINE

## 2025-07-14 PROCEDURE — 76705 ECHO EXAM OF ABDOMEN: CPT

## 2025-07-14 PROCEDURE — 6360000002 HC RX W HCPCS: Performed by: INTERNAL MEDICINE

## 2025-07-14 PROCEDURE — 78227 HEPATOBIL SYST IMAGE W/DRUG: CPT

## 2025-07-14 RX ADMIN — Medication 6 MILLICURIE: at 10:43

## 2025-07-14 RX ADMIN — SINCALIDE 2.04 MCG: 5 INJECTION, POWDER, LYOPHILIZED, FOR SOLUTION INTRAVENOUS at 11:30
